# Patient Record
Sex: FEMALE
[De-identification: names, ages, dates, MRNs, and addresses within clinical notes are randomized per-mention and may not be internally consistent; named-entity substitution may affect disease eponyms.]

---

## 2018-07-23 ENCOUNTER — APPOINTMENT (OUTPATIENT)
Dept: INTERNAL MEDICINE | Facility: CLINIC | Age: 53
End: 2018-07-23
Payer: COMMERCIAL

## 2018-07-23 VITALS
SYSTOLIC BLOOD PRESSURE: 130 MMHG | TEMPERATURE: 98 F | WEIGHT: 233 LBS | DIASTOLIC BLOOD PRESSURE: 80 MMHG | BODY MASS INDEX: 38.82 KG/M2 | OXYGEN SATURATION: 97 % | HEIGHT: 65 IN | HEART RATE: 109 BPM

## 2018-07-23 DIAGNOSIS — K21.9 GASTRO-ESOPHAGEAL REFLUX DISEASE W/OUT ESOPHAGITIS: ICD-10-CM

## 2018-07-23 DIAGNOSIS — Z00.00 ENCOUNTER FOR GENERAL ADULT MEDICAL EXAMINATION W/OUT ABNORMAL FINDINGS: ICD-10-CM

## 2018-07-23 DIAGNOSIS — Z78.9 OTHER SPECIFIED HEALTH STATUS: ICD-10-CM

## 2018-07-23 DIAGNOSIS — Z87.891 PERSONAL HISTORY OF NICOTINE DEPENDENCE: ICD-10-CM

## 2018-07-23 DIAGNOSIS — E55.9 VITAMIN D DEFICIENCY, UNSPECIFIED: ICD-10-CM

## 2018-07-23 DIAGNOSIS — F41.9 ANXIETY DISORDER, UNSPECIFIED: ICD-10-CM

## 2018-07-23 DIAGNOSIS — M25.562 PAIN IN LEFT KNEE: ICD-10-CM

## 2018-07-23 DIAGNOSIS — J30.9 ALLERGIC RHINITIS, UNSPECIFIED: ICD-10-CM

## 2018-07-23 DIAGNOSIS — E66.9 OBESITY, UNSPECIFIED: ICD-10-CM

## 2018-07-23 DIAGNOSIS — R51 HEADACHE: ICD-10-CM

## 2018-07-23 DIAGNOSIS — K44.9 DIAPHRAGMATIC HERNIA W/OUT OBSTRUCTION OR GANGRENE: ICD-10-CM

## 2018-07-23 DIAGNOSIS — G47.00 INSOMNIA, UNSPECIFIED: ICD-10-CM

## 2018-07-23 DIAGNOSIS — S62.102A FRACTURE OF UNSPECIFIED CARPAL BONE, LEFT WRIST, INITIAL ENCOUNTER FOR CLOSED FRACTURE: ICD-10-CM

## 2018-07-23 PROCEDURE — 99204 OFFICE O/P NEW MOD 45 MIN: CPT

## 2018-07-23 RX ORDER — CHROMIUM 200 MCG
1000 TABLET ORAL DAILY
Qty: 90 | Refills: 3 | Status: ACTIVE | COMMUNITY
Start: 2018-07-23

## 2018-07-23 RX ORDER — SERTRALINE HYDROCHLORIDE 50 MG/1
50 TABLET, FILM COATED ORAL
Qty: 90 | Refills: 1 | Status: ACTIVE | COMMUNITY
Start: 2018-07-23 | End: 1900-01-01

## 2018-07-23 RX ORDER — IBUPROFEN 200 MG/1
200 TABLET ORAL TWICE DAILY
Refills: 0 | Status: ACTIVE | COMMUNITY
Start: 2018-07-23

## 2018-07-23 RX ORDER — ESOMEPRAZOLE MAGNESIUM 20 MG/1
20 CAPSULE, DELAYED RELEASE ORAL DAILY
Refills: 0 | Status: ACTIVE | COMMUNITY
Start: 2018-07-23

## 2018-07-23 RX ORDER — CETIRIZINE HYDROCHLORIDE 10 MG/1
10 TABLET, FILM COATED ORAL
Refills: 0 | Status: ACTIVE | COMMUNITY
Start: 2018-07-23

## 2018-07-23 NOTE — ASSESSMENT
[FreeTextEntry1] : 52-year-old female presented for the first time for transition of care. She is looking for a PCP. The patient has several concerns she wants to discuss today.\par \par The patient fell about 3 weeks ago onto her knees and left ankle numbness is mostly recovered, except for mild discomfort of the left knee. It appeared that the patient has a contribution on her left knee that is resolving. The patient has pretty good range of motion of the left knee,  actually has a mild straight leg raising on the left side. The patient was advised on heating pad and stretching exercises for her back. The patient has no problem with weight bearing. She was advised to try to loose weight given her the problem. She was reassured that left knee will probably recover with some time.\par \par Patient also complains of claustrophobia that has been getting worse  recently, she also suffers from long-standing insomnia. Counseling was provided. I will start her on Zoloft 50 mg daily. I also recommended that she followup with a psychiatrist.\par \par Patient apparently has frequent headaches, and she takes ibuprofen quite often. We discussed the different etiology of headaches, the patient was advised to increase fluid intake, consider taking an antihistamine he is a sinus component. She was advised to avoid overuse of NSAIDs.\par \par The patient was also advised to get labs done and return for a full physical exam.

## 2018-07-23 NOTE — HISTORY OF PRESENT ILLNESS
[FreeTextEntry8] : Pt presented for the first time for transition of care.  She 's looking for a new PCP.  she hasn't have a PE in about 2 years\par \par She fell on 7/4/2018 on her knees, and it got better but still has some limited ROM in L knee.  She injured both ankles and L knee, her ankle is better, this is no longer affecting her walk. \par \par Pt also c/o claustrophobia, but it's now getting worse and more frequent, it's affecting her traveling to work, on the train.  She occ had a full panic attack, she had to get off the train, then she was late to work.  She had to use a [employer] elevator to go to work, instead of the regular elevator.\par \par Pt also c/o not sleeping well, she has tried taking OTC melatonin, and ZQuil, etc, but they don't well.  She sleeps for a few hours, and then stay awake.  This doesn't really affect her ability to work.   This has been going for years, and it hasn't changed much.  She sleeps 2=4 hours a night.  Pt usually have diet Coke in the morning.  \par \par Pt also c/o of having a lot of HA, and she takes Ibuprofen as needed for HA.

## 2018-07-23 NOTE — PHYSICAL EXAM
[No Acute Distress] : no acute distress [Well Nourished] : well nourished [Well Developed] : well developed [No Respiratory Distress] : no respiratory distress  [Clear to Auscultation] : lungs were clear to auscultation bilaterally [Normal Rate] : normal rate  [Regular Rhythm] : with a regular rhythm [No Edema] : there was no peripheral edema [No Extremity Clubbing/Cyanosis] : no extremity clubbing/cyanosis [Soft] : abdomen soft [Non Tender] : non-tender [Normal Bowel Sounds] : normal bowel sounds [No CVA Tenderness] : no CVA  tenderness [No Spinal Tenderness] : no spinal tenderness [Normal Gait] : normal gait [Coordination Grossly Intact] : coordination grossly intact [No Focal Deficits] : no focal deficits [de-identified] : obese female, [de-identified] : There was mild straight leg raising on L, [de-identified] : b/l knee with minial resolving ecchymosis.  There was mild edema on L knee, but has good ROM

## 2018-09-28 ENCOUNTER — APPOINTMENT (OUTPATIENT)
Dept: INTERNAL MEDICINE | Facility: CLINIC | Age: 53
End: 2018-09-28